# Patient Record
Sex: FEMALE | Race: WHITE | NOT HISPANIC OR LATINO | Employment: FULL TIME | URBAN - METROPOLITAN AREA
[De-identification: names, ages, dates, MRNs, and addresses within clinical notes are randomized per-mention and may not be internally consistent; named-entity substitution may affect disease eponyms.]

---

## 2024-04-29 ENCOUNTER — OFFICE VISIT (OUTPATIENT)
Dept: URGENT CARE | Facility: CLINIC | Age: 24
End: 2024-04-29

## 2024-04-29 VITALS
TEMPERATURE: 98.7 F | DIASTOLIC BLOOD PRESSURE: 88 MMHG | OXYGEN SATURATION: 97 % | HEART RATE: 103 BPM | SYSTOLIC BLOOD PRESSURE: 130 MMHG | WEIGHT: 209 LBS

## 2024-04-29 DIAGNOSIS — R11.2 NAUSEA AND VOMITING, UNSPECIFIED VOMITING TYPE: Primary | ICD-10-CM

## 2024-04-29 PROCEDURE — 99203 OFFICE O/P NEW LOW 30 MIN: CPT | Performed by: NURSE PRACTITIONER

## 2024-04-29 RX ORDER — PROMETHAZINE HYDROCHLORIDE 12.5 MG/1
12.5 TABLET ORAL EVERY 6 HOURS PRN
Qty: 28 TABLET | Refills: 0 | Status: SHIPPED | OUTPATIENT
Start: 2024-04-29 | End: 2024-05-06

## 2024-04-29 ASSESSMENT — ENCOUNTER SYMPTOMS: VOMITING: 1

## 2024-04-29 NOTE — PROGRESS NOTES
Subjective   Patient ID: Josi Castillo is a 23 y.o. female.    Patient presents with morning sickness and extremely long PMS symptoms per pt. Sx x 1 year, patient states no current PCP.  She happens about once a month typically around her menstrual cycle although she had a menstrual cycle and on Friday and got sick this morning I asked her more about possible recreational use of marijuana she smokes 2-3 times a week she did smoke last night before going to bed and woke up this morning sick.  I had a long discussion with her about cannabinoid induced hyperemesis.  She denies any hematemesis, melena, hematochezia.  No previous EGDs or gastrointestinal diagnoses      History provided by:  Patient  Vomiting      The following portions of the chart were reviewed this encounter and updated as appropriate:         Review of Systems   Gastrointestinal:  Positive for vomiting.   All other systems reviewed and are negative.    Objective   Physical Exam  Vitals and nursing note reviewed.   Constitutional:       General: She is not in acute distress.     Appearance: Normal appearance. She is not toxic-appearing.   HENT:      Head: Normocephalic.      Right Ear: External ear normal.      Left Ear: External ear normal.      Nose: Nose normal.      Mouth/Throat:      Mouth: Mucous membranes are moist.      Pharynx: No oropharyngeal exudate or posterior oropharyngeal erythema.   Eyes:      Extraocular Movements: Extraocular movements intact.   Cardiovascular:      Rate and Rhythm: Normal rate and regular rhythm.      Heart sounds: Normal heart sounds.   Pulmonary:      Effort: Pulmonary effort is normal.      Breath sounds: Normal breath sounds. No wheezing.   Musculoskeletal:         General: Normal range of motion.      Cervical back: Normal range of motion and neck supple.   Skin:     Capillary Refill: Capillary refill takes less than 2 seconds.   Neurological:      General: No focal deficit present.      Mental Status: She is  alert and oriented to person, place, and time.   Psychiatric:         Mood and Affect: Mood normal.         Behavior: Behavior normal.         Thought Content: Thought content normal.       Procedures    Assessment/Plan   Diagnoses and all orders for this visit:  Nausea and vomiting, unspecified vomiting type  -     promethazine (Phenergan) 12.5 mg tablet; Take 1 tablet (12.5 mg) by mouth every 6 hours if needed for nausea or vomiting for up to 7 days.  Please limit marijuana use  May be follow-up with primary care for possible laboratory studies including hormone levels if symptoms persist may need to be seen to provide gastroenterology.    Patient disposition: Home

## 2024-04-29 NOTE — LETTER
April 29, 2024     Patient: Jois Castillo   YOB: 2000   Date of Visit: 4/29/2024       To Whom It May Concern:    It is my medical opinion that Josi Castillo may return to work on 4/30/2024 .    If you have any questions or concerns, please don't hesitate to call.         Sincerely,        Shadi Lopez, TORREY-CNP    CC: No Recipients